# Patient Record
(demographics unavailable — no encounter records)

---

## 2025-03-06 NOTE — END OF VISIT
[FreeTextEntry3] : I, Bebo Multani MD, I personally performed the services described in the documentation, reviewed the documentation recorded by the scribe in my presence and it accurately and completely records my words and actions

## 2025-03-06 NOTE — REVIEW OF SYSTEMS
[Change in Activity] : no change in activity [Fever Above 102] : no fever [Rash] : no rash [Itching] : no itching [Nasal Stuffiness] : no nasal congestion [Sore Throat] : no sore throat [Change in Appetite] : no change in appetite [Vomiting] : no vomiting [Joint Pains] : no arthralgias [Joint Swelling] : no joint swelling [Muscle Aches] : muscle aches

## 2025-03-06 NOTE — REASON FOR VISIT
[Initial Evaluation] : an initial evaluation [Patient] : patient [Father] : father [FreeTextEntry1] : left forearm injury sustained on 02/28/2025.

## 2025-03-06 NOTE — DATA REVIEWED
[de-identified] : X-Rays left forearm were obtained  today and independently reviewed today 03/06/25   proximal radius and ulna shaft fracture  with acceptable alignment for age. No signs of interval healing. Skeletally immature individual.

## 2025-03-06 NOTE — ASSESSMENT
[FreeTextEntry1] : 13-year-old male with nondisplaced fractures of the left proximal radius and ulna sustained on 02/28/2025.   Today's visit included obtaining the history from the child and parent, due to the child's age, the child could not be considered a reliable historian, requiring the parent to act as an independent historian. The condition, natural history, and prognosis were explained to the patient and family. The clinical findings and images were reviewed with the family.  Left forearm radiographs IN CAST were ordered, obtained, and independently reviewed in clinic on 03/06/2025 depicting unchanged alignment from ER images. There is no evidence of healing.   At this time, he will continue LAC. Cast care instructions were reviewed with patient and parents.   No gym, no sports, rough play until cleared by our clinic. Updated school note was provided.   We will plan to see him back in 4 weeks for cast removal repeat X-Rays OUT OF CAST and reevaluation.    All questions and concerns were addressed. Father vocalized understanding and agreement to assessment and treatment.  I, Carly Mcdowell, have acted as a scribe and documented the above information for Dr. Multani

## 2025-03-06 NOTE — HISTORY OF PRESENT ILLNESS
[FreeTextEntry1] : 13-year-old male who presents today with father for initial evaluation of left forearm injury sustained on 02/28/2025. Father states that he was playing around with friends when one jumped on his back causing him to fall forward on his left outstretched arm. He was taken to [facility] where X-Rays left forearm were performed and confirmed a nondisplaced fracture of the radius and ulna and recommended for orthopedic evaluation. He was placed in a long arm cast. Today father reports that he is tolerating the cast well denies any discomfort or pain. Denies any tingling sensation or radiating pain. He is not taking any pain medication now. Here for further orthopedic evaluation.

## 2025-04-03 NOTE — PHYSICAL EXAM
[FreeTextEntry1] : Gait: Presents ambulating independently without signs of antalgia. Good coordination and balance noted. GENERAL: alert, cooperative, in NAD SKIN: The skin is intact, warm, pink and dry over the area examined. EYES: Normal conjunctiva, normal eyelids and pupils were equal and round. ENT: normal ears, normal nose and normal lips. CARDIOVASCULAR: brisk capillary refill, but no peripheral edema. RESPIRATORY: The patient is in no apparent respiratory distress. They're taking full deep breaths without use of accessory muscles or evidence of audible wheezes or stridor without the use of a stethoscope. Normal respiratory effort. ABDOMEN: not examined  LUE Long-arm cast is in place. Appears well fitting. Removed for examination  - No skin irritation or breakdown  - Able to fully flex and extend all fingers  - Able to perform a thumbs up maneuver (PIN), OK sign (AIN), finger crossover (ulnar) - Fingers are warm and appear well perfused with brisk capillary refill - Limited elbow and wrist range of motion due to stiffness

## 2025-04-03 NOTE — REVIEW OF SYSTEMS
[Muscle Aches] : muscle aches [Change in Activity] : no change in activity [Fever Above 102] : no fever [Rash] : no rash [Itching] : no itching [Nasal Stuffiness] : no nasal congestion [Sore Throat] : no sore throat [Change in Appetite] : no change in appetite [Vomiting] : no vomiting [Joint Pains] : no arthralgias [Joint Swelling] : no joint swelling

## 2025-04-03 NOTE — ASSESSMENT
[FreeTextEntry1] : 13-year-old male with nondisplaced fractures of the left proximal radius and ulna sustained on 02/28/2025.  Today's visit included obtaining the history from the child and parent, due to the child's age, the child could not be considered a reliable historian, requiring the parent to act as an independent historian.   The condition, natural history, and prognosis were explained to the patient and family. The clinical findings and images were reviewed with the family.  His LAC was removed today in the office. XRs left forearm OOC reviewed revealing interval healing and callus formation. At this time, no further immobilization is needed. He can start working on gentle elbow and wrist range of motion at home. Sample exercises demonstrated in the office. Avoid gym, sports and recess. School note provided. He will f/u in 3 weeks for repeat clinical evaluation and XR left forearm. All questions answered. Family and patient verbalize understanding of the plan.   Laura VALDEZ PA-C have acted as scribe and documented the above for Dr. Multani

## 2025-04-03 NOTE — REASON FOR VISIT
[Follow Up] : a follow up visit [Patient] : patient [Father] : father [FreeTextEntry1] : left forearm injury sustained on 02/28/2025.

## 2025-04-03 NOTE — HISTORY OF PRESENT ILLNESS
[FreeTextEntry1] : 13-year-old male who presents today with father for follow up of left forearm injury sustained on 02/28/2025. Father states that he was playing around with friends when one jumped on his back causing him to fall forward on his left outstretched arm. He was taken to [facility] where X-Rays left forearm were performed and confirmed a nondisplaced fracture of the radius and ulna and recommended for orthopedic evaluation. He was placed in a long arm cast. Today father reports that he is tolerating the cast well denies any discomfort or pain. Denies any tingling sensation or radiating pain. Here for cast removal and further orthopedic management.

## 2025-04-03 NOTE — HISTORY OF PRESENT ILLNESS
[FreeTextEntry1] : 13-year-old male who presents today with father for follow up of left forearm injury sustained on 02/28/2025. Father states that he was playing around with friends when one jumped on his back causing him to fall forward on his left outstretched arm. He was taken to [facility] where X-Rays left forearm were performed and confirmed a nondisplaced fracture of the radius and ulna and recommended for orthopedic evaluation. He was placed in a long arm cast. Today father reports that he is tolerating the cast well denies any discomfort or pain. Denies any tingling sensation or radiating pain. Here for cast removal and further orthopedic management.

## 2025-04-03 NOTE — DATA REVIEWED
[de-identified] : X-Rays left forearm were obtained  today and independently reviewed today 4/3/25 OOC   proximal radius and ulna shaft fracture with acceptable alignment for age. Interval healing and callus formation. Skeletally immature individual.

## 2025-04-03 NOTE — DATA REVIEWED
[de-identified] : X-Rays left forearm were obtained  today and independently reviewed today 4/3/25 OOC   proximal radius and ulna shaft fracture with acceptable alignment for age. Interval healing and callus formation. Skeletally immature individual.

## 2025-04-25 NOTE — PHYSICAL EXAM
[FreeTextEntry1] : Gait: Presents ambulating independently without signs of antalgia. Good coordination and balance noted. GENERAL: alert, cooperative, in NAD SKIN: The skin is intact, warm, pink and dry over the area examined. EYES: Normal conjunctiva, normal eyelids and pupils were equal and round. ENT: normal ears, normal nose and normal lips. CARDIOVASCULAR: brisk capillary refill, but no peripheral edema. RESPIRATORY: The patient is in no apparent respiratory distress. They're taking full deep breaths without use of accessory muscles or evidence of audible wheezes or stridor without the use of a stethoscope. Normal respiratory effort. ABDOMEN: not examined  LUE - Able to fully flex and extend all fingers  - Able to perform a thumbs up maneuver (PIN), OK sign (AIN), finger crossover (ulnar) - Fingers are warm and appear well perfused with brisk capillary refill - No limitation in elbow and wrist range of motion

## 2025-04-25 NOTE — ASSESSMENT
[FreeTextEntry1] : 13-year-old male with nondisplaced fractures of the left proximal radius and ulna sustained on 02/28/2025.  Today's visit included obtaining the history from the child and parent, due to the child's age, the child could not be considered a reliable historian, requiring the parent to act as an independent historian.   The condition, natural history, and prognosis were explained to the patient and family. The clinical findings and images were reviewed with the family.  XRays of left forearm show interval healing and callus formation. At this time the patient can return to all activities. Possible refracture discussed with parent if the patient falls on left forearm again. School note was provided. No further follow up is needed at this time. All questions were answered and family/patient understand and agree to plan.   Nova VALDEZ PA-C have acted as scribe and documented the above for Dr. Multani

## 2025-04-25 NOTE — HISTORY OF PRESENT ILLNESS
[FreeTextEntry1] : 13-year-old male who presents today with father for follow up of left forearm injury sustained on 02/28/2025. Father states that he was playing around with friends when one jumped on his back causing him to fall forward on his left outstretched arm. He was taken to [facility] where X-Rays left forearm were performed and confirmed a nondisplaced fracture of the radius and ulna and recommended for orthopedic evaluation. He was placed in a long arm cast which was taken off on 04/03/25. Today father states that patient has had no pain or restriction in activity. Has not had any stiffness or numbness/tingling. Here for reevaluation of forearm and clearance back to full activity.

## 2025-04-25 NOTE — DATA REVIEWED
[de-identified] : X-Rays left forearm were obtained today and independently reviewed today 4/24/25 proximal radius and ulna shaft fracture with acceptable alignment for age. Interval healing and callus formation. Skeletally immature individual.

## 2025-04-25 NOTE — HISTORY OF PRESENT ILLNESS
[FreeTextEntry1] : 13-year-old male who presents today with father for follow up of left forearm injury sustained on 02/28/2025. Father states that he was playing around with friends when one jumped on his back causing him to fall forward on his left outstretched arm. He was taken to [facility] where X-Rays left forearm were performed and confirmed a nondisplaced fracture of the radius and ulna and recommended for orthopedic evaluation. He was placed in a long arm cast which was taken off on 04/03/25. Today father states that patient has had no pain or restriction in activity. Has not had any stiffness or numbness/tingling. Here for reevaluation of forearm and clearance back to full activity.

## 2025-04-25 NOTE — REVIEW OF SYSTEMS
[Muscle Aches] : muscle aches [Appropriate Age Development] : development appropriate for age [Change in Activity] : no change in activity [Fever Above 102] : no fever [Rash] : no rash [Itching] : no itching [Nasal Stuffiness] : no nasal congestion [Sore Throat] : no sore throat [Earache] : no earache [Heart Problems] : no heart problems [Murmur] : no murmur [High Blood Pressure] : no high blood pressure [Tachypnea] : no tachypnea [Wheezing] : no wheezing [Cough] : no cough [Shortness of Breath] : no shortness of breath [Change in Appetite] : no change in appetite [Vomiting] : no vomiting [Diarrhea] : no diarrhea [Joint Pains] : no arthralgias [Joint Swelling] : no joint swelling

## 2025-04-25 NOTE — DATA REVIEWED
[de-identified] : X-Rays left forearm were obtained today and independently reviewed today 4/24/25 proximal radius and ulna shaft fracture with acceptable alignment for age. Interval healing and callus formation. Skeletally immature individual.